# Patient Record
Sex: MALE | Race: WHITE | NOT HISPANIC OR LATINO | URBAN - METROPOLITAN AREA
[De-identification: names, ages, dates, MRNs, and addresses within clinical notes are randomized per-mention and may not be internally consistent; named-entity substitution may affect disease eponyms.]

---

## 2018-12-09 ENCOUNTER — EMERGENCY (EMERGENCY)
Facility: HOSPITAL | Age: 33
LOS: 1 days | Discharge: HOME | End: 2018-12-09
Admitting: EMERGENCY MEDICINE
Payer: COMMERCIAL

## 2018-12-09 VITALS
OXYGEN SATURATION: 100 % | HEART RATE: 91 BPM | RESPIRATION RATE: 17 BRPM | SYSTOLIC BLOOD PRESSURE: 143 MMHG | TEMPERATURE: 98 F | DIASTOLIC BLOOD PRESSURE: 89 MMHG

## 2018-12-09 DIAGNOSIS — Z79.899 OTHER LONG TERM (CURRENT) DRUG THERAPY: ICD-10-CM

## 2018-12-09 DIAGNOSIS — H57.12 OCULAR PAIN, LEFT EYE: ICD-10-CM

## 2018-12-09 DIAGNOSIS — H50.9 UNSPECIFIED STRABISMUS: ICD-10-CM

## 2018-12-09 PROCEDURE — 99284 EMERGENCY DEPT VISIT MOD MDM: CPT

## 2018-12-09 PROCEDURE — 99283 EMERGENCY DEPT VISIT LOW MDM: CPT | Mod: 25

## 2018-12-09 RX ORDER — ONDANSETRON 8 MG/1
4 TABLET, FILM COATED ORAL ONCE
Qty: 0 | Refills: 0 | Status: COMPLETED | OUTPATIENT
Start: 2018-12-09 | End: 2018-12-09

## 2018-12-09 RX ORDER — OMEPRAZOLE 10 MG/1
1 CAPSULE, DELAYED RELEASE ORAL
Qty: 0 | Refills: 0 | COMMUNITY

## 2018-12-09 RX ORDER — IBUPROFEN 200 MG
600 TABLET ORAL ONCE
Qty: 0 | Refills: 0 | Status: COMPLETED | OUTPATIENT
Start: 2018-12-09 | End: 2018-12-09

## 2018-12-09 RX ORDER — OFLOXACIN 0.3 %
1 DROPS OPHTHALMIC (EYE) ONCE
Qty: 0 | Refills: 0 | Status: COMPLETED | OUTPATIENT
Start: 2018-12-09 | End: 2018-12-09

## 2018-12-09 RX ORDER — PREDNISOLONE SODIUM PHOSPHATE 1 %
2 DROPS OPHTHALMIC (EYE) ONCE
Qty: 0 | Refills: 0 | Status: COMPLETED | OUTPATIENT
Start: 2018-12-09 | End: 2018-12-09

## 2018-12-09 RX ORDER — OFLOXACIN 0.3 %
1 DROPS OPHTHALMIC (EYE)
Qty: 1 | Refills: 0 | OUTPATIENT
Start: 2018-12-09 | End: 2018-12-13

## 2018-12-09 RX ORDER — ERYTHROMYCIN BASE 5 MG/GRAM
1 OINTMENT (GRAM) OPHTHALMIC (EYE)
Qty: 1 | Refills: 0 | OUTPATIENT
Start: 2018-12-09 | End: 2018-12-18

## 2018-12-09 RX ORDER — PREDNISOLONE SODIUM PHOSPHATE 1 %
1 DROPS OPHTHALMIC (EYE)
Qty: 1 | Refills: 0 | OUTPATIENT
Start: 2018-12-09 | End: 2018-12-10

## 2018-12-09 RX ADMIN — Medication 1 DROP(S): at 06:39

## 2018-12-09 RX ADMIN — Medication 2 DROP(S): at 06:39

## 2018-12-09 RX ADMIN — ONDANSETRON 4 MILLIGRAM(S): 8 TABLET, FILM COATED ORAL at 06:39

## 2018-12-09 RX ADMIN — Medication 600 MILLIGRAM(S): at 06:39
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
## 2018-12-09 NOTE — ED ADULT NURSE NOTE - NSIMPLEMENTINTERV_GEN_ALL_ED
Implemented All Universal Safety Interventions:  [place] to call system. Call bell, personal items and telephone within reach. Instruct patient to call for assistance. Room bathroom lighting operational. Non-slip footwear when patient is off stretcher. Physically safe environment: no spills, clutter or unnecessary equipment. Stretcher in lowest position, wheels locked, appropriate side rails in place.
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
## 2018-12-09 NOTE — ED PROVIDER NOTE - NSFOLLOWUPINSTRUCTIONS_ED_ALL_ED_FT
Use the eye medications as prescribed.    Follow up with Dr Wheeler (ophthalmologist) tomorrow (Monday) at 9 am.  If you want to go later in the morning, call her. Use the eye medications as prescribed:    Prednisolone acetate 1% 2 drops in left eye four times/day.  Ofloxacin ophthalmic 1 drop in left eye four times/day.  Erythromycin ophthalmic ointment every 4 hours as needed for discomfort.    Follow up with Dr Wheeler (ophthalmologist) tomorrow (Monday) at 9 am.  If you want to go later in the morning, call her.

## 2018-12-09 NOTE — ED PROVIDER NOTE - PHYSICAL EXAMINATION
GEN: awake, alert, NAD  EYES: Corrected VA: Right 20/40, left 20/70.  EOMI.  Lids normal. Left eye mild conjunctival injection and small SHANELLE.  AC clear, no hyphema.  Left pupil slightly irregular and dilated to 6 mm, nonreactive.  Right pupil round 4 mm, reactive.  Left eye pain with accomodation and light.  IOP left 17, right 22.  Wispy fluorescein uptake left cornea.   PULM: Lungs clear  CV: RRR S1S2  GI: Abd soft, nontender  MSK: RAMOS without difficulty  SKIN: normal color and turgor, no rash  NEURO: Alert, oriented. RAMOS normally.  Speech clear.  Gait steady. GEN: awake, alert, NAD  EYES: Corrected VA: Right 20/40, left 20/70.  EOMI.  Lids normal. Left eye mild conjunctival injection and small SHANELLE.  AC clear, no hyphema.  Left pupil slightly irregular and dilated to 6 mm, nonreactive.  Right pupil round 4 mm, reactive.  Left eye pain with accomodation and light.  IOP right 17, left 22.  Wispy fluorescein uptake left cornea. No fluorescein streaming.  PULM: Lungs clear  CV: RRR S1S2  GI: Abd soft, nontender  MSK: RAMOS without difficulty  SKIN: normal color and turgor, no rash  NEURO: Alert, oriented. RAMOS normally.  Speech clear.  Gait steady.
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
## 2018-12-09 NOTE — ED PROVIDER NOTE - PROGRESS NOTE DETAILS
d/w Dr Wheeler: agrees likely traumatic iritis. Start prednisolone acetate 1% QID, ofloxacin oph QID, erythromycin oint PRN pain.  She will see in the office tomorrow morning 9 am.